# Patient Record
Sex: MALE | Race: OTHER | Employment: UNEMPLOYED | RURAL
[De-identification: names, ages, dates, MRNs, and addresses within clinical notes are randomized per-mention and may not be internally consistent; named-entity substitution may affect disease eponyms.]

---
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
## 2025-04-16 ENCOUNTER — OFFICE VISIT (OUTPATIENT)
Facility: CLINIC | Age: 20
End: 2025-04-16
Payer: MEDICAID

## 2025-04-16 VITALS
DIASTOLIC BLOOD PRESSURE: 84 MMHG | HEIGHT: 70 IN | HEART RATE: 75 BPM | WEIGHT: 272.6 LBS | RESPIRATION RATE: 12 BRPM | BODY MASS INDEX: 39.03 KG/M2 | TEMPERATURE: 98.1 F | SYSTOLIC BLOOD PRESSURE: 134 MMHG | OXYGEN SATURATION: 96 %

## 2025-04-16 DIAGNOSIS — Z02.89 ENCOUNTER FOR OCCUPATIONAL HEALTH ASSESSMENT: Primary | ICD-10-CM

## 2025-04-16 PROBLEM — F41.9 ANXIETY: Status: RESOLVED | Noted: 2019-06-17 | Resolved: 2025-04-16

## 2025-04-16 PROBLEM — F43.29 ADJUSTMENT DISORDER WITH DISTURBANCE OF EMOTION: Status: RESOLVED | Noted: 2018-10-04 | Resolved: 2025-04-16

## 2025-04-16 PROCEDURE — 99212 OFFICE O/P EST SF 10 MIN: CPT | Performed by: FAMILY MEDICINE

## 2025-04-16 SDOH — ECONOMIC STABILITY: FOOD INSECURITY: WITHIN THE PAST 12 MONTHS, YOU WORRIED THAT YOUR FOOD WOULD RUN OUT BEFORE YOU GOT MONEY TO BUY MORE.: NEVER TRUE

## 2025-04-16 SDOH — ECONOMIC STABILITY: FOOD INSECURITY: WITHIN THE PAST 12 MONTHS, THE FOOD YOU BOUGHT JUST DIDN'T LAST AND YOU DIDN'T HAVE MONEY TO GET MORE.: NEVER TRUE

## 2025-04-16 ASSESSMENT — ANXIETY QUESTIONNAIRES
2. NOT BEING ABLE TO STOP OR CONTROL WORRYING: NOT AT ALL
3. WORRYING TOO MUCH ABOUT DIFFERENT THINGS: NOT AT ALL
4. TROUBLE RELAXING: NOT AT ALL
7. FEELING AFRAID AS IF SOMETHING AWFUL MIGHT HAPPEN: NOT AT ALL
6. BECOMING EASILY ANNOYED OR IRRITABLE: NOT AT ALL
5. BEING SO RESTLESS THAT IT IS HARD TO SIT STILL: NOT AT ALL
GAD7 TOTAL SCORE: 0
IF YOU CHECKED OFF ANY PROBLEMS ON THIS QUESTIONNAIRE, HOW DIFFICULT HAVE THESE PROBLEMS MADE IT FOR YOU TO DO YOUR WORK, TAKE CARE OF THINGS AT HOME, OR GET ALONG WITH OTHER PEOPLE: NOT DIFFICULT AT ALL
1. FEELING NERVOUS, ANXIOUS, OR ON EDGE: NOT AT ALL

## 2025-04-16 ASSESSMENT — PATIENT HEALTH QUESTIONNAIRE - PHQ9
SUM OF ALL RESPONSES TO PHQ QUESTIONS 1-9: 0
SUM OF ALL RESPONSES TO PHQ QUESTIONS 1-9: 0
1. LITTLE INTEREST OR PLEASURE IN DOING THINGS: NOT AT ALL
SUM OF ALL RESPONSES TO PHQ QUESTIONS 1-9: 0
2. FEELING DOWN, DEPRESSED OR HOPELESS: NOT AT ALL
SUM OF ALL RESPONSES TO PHQ QUESTIONS 1-9: 0

## 2025-04-16 NOTE — PROGRESS NOTES
\"Have you been to the ER, urgent care clinic since your last visit?  Hospitalized since your last visit?\"    NO    “Have you seen or consulted any other health care providers outside our system since your last visit?”    NO             Chief Complaint   Patient presents with    Letter for School/Work     Would like a letter that states he does not have any symptoms in anxiety/depression. Would like to also check to see if he needs any updates on immunizations

## 2025-04-16 NOTE — PROGRESS NOTES
Ailyn Correa II (:  2005) is a 20 y.o. male, Established patient, here for evaluation of the following chief complaint(s): Noted to join the   Letter for School/Work (Would like a letter that states he does not have any symptoms in anxiety/depression. Would like to also check to see if he needs any updates on immunizations)         Assessment & Plan  1. Depression.  - Reports no longer experiencing symptoms of depression and has been off medication for 8 months.  - Manages symptoms through personal coping mechanisms such as writing, nature walks, and reading.  - No current signs of suicidal ideation or self-harm.  - A note confirming the absence of depressive symptoms will be provided to support his application to the [employer].    2. Anxiety.  - Reports no longer experiencing symptoms of anxiety and has been off medication for 8 months.  - Manages symptoms through personal coping mechanisms such as writing, nature walks, and reading.  - No current signs of anxiety or panic attacks.  - A note confirming the absence of anxiety symptoms will be provided to support his application to the [employer].    Results    1. Encounter for occupational health assessment          Diagnosis Orders   1. Encounter for occupational health assessment          No orders of the defined types were placed in this encounter.    No current outpatient medications on file.     No current facility-administered medications for this visit.     Return if symptoms worsen or fail to improve.      Subjective   History of Present Illness  The patient is a 20-year-old male who presents for evaluation of depression and anxiety.    He is seeking a written confirmation of his current mental health status, specifically the absence of depressive and anxiety symptoms, as part of his application process for the [employer]. He has been managing his mental health independently for the past 8 months, without the aid of any medications. His mood remains

## 2025-07-30 NOTE — PROGRESS NOTES
Ailyn Correa II (:  2005) is a 20 y.o. male, Established patient, here for evaluation of the following chief complaint(s): paperwork  Check-Up (Needs updatd letter for )         Assessment & Plan  1. Anxiety.  - Reports no current symptoms of anxiety.  - No medications are being taken.  - Verification paperwork requested to confirm absence of anxiety symptoms.  - A note will be provided stating that he does not suffer from anxiety, is not on any medications, and is cleared to join the [employer].    2. Depression.  - Reports no current symptoms of depression, including sadness, crying, or thoughts of self-harm.  - No medications are being taken.  - Verification paperwork requested to confirm absence of depression symptoms.  - A note will be provided stating that he does not suffer from depression, is not on any medications, and is cleared to join the [employer].    Results    1. History of anxiety          Diagnosis Orders   1. History of anxiety          No orders of the defined types were placed in this encounter.    No current outpatient medications on file.     No current facility-administered medications for this visit.     No follow-ups on file.      Subjective   History of Present Illness  The patient is a 20-year-old male who presents for evaluation of anxiety and depression.    He is seeking documentation to confirm that he no longer exhibits symptoms of anxiety or depression, which will facilitate his application process for  service. He had previously obtained this paperwork in 2025 but misplaced it due to unforeseen family circumstances that required him to secure employment. Currently, he is not on any medication. He reports feeling well, with no signs of sadness or depression. He also does not experience excessive crying, self-harm thoughts, or auditory hallucinations.    Review of Systems       Objective     /81 (BP Site: Left Upper Arm, Patient Position: Sitting, BP Cuff

## 2025-07-31 ENCOUNTER — OFFICE VISIT (OUTPATIENT)
Facility: CLINIC | Age: 20
End: 2025-07-31
Payer: MEDICAID

## 2025-07-31 VITALS
HEART RATE: 82 BPM | WEIGHT: 310.4 LBS | HEIGHT: 70 IN | SYSTOLIC BLOOD PRESSURE: 131 MMHG | DIASTOLIC BLOOD PRESSURE: 81 MMHG | TEMPERATURE: 98.2 F | BODY MASS INDEX: 44.44 KG/M2 | OXYGEN SATURATION: 98 % | RESPIRATION RATE: 18 BRPM

## 2025-07-31 DIAGNOSIS — Z86.59 HISTORY OF ANXIETY: Primary | ICD-10-CM

## 2025-07-31 PROCEDURE — 99212 OFFICE O/P EST SF 10 MIN: CPT | Performed by: FAMILY MEDICINE

## 2025-07-31 NOTE — PROGRESS NOTES
Have you been to the ER, urgent care clinic since your last visit?  Hospitalized since your last visit?   NO    Have you seen or consulted any other health care providers outside our system since your last visit?   NO             Chief Complaint   Patient presents with    Check-Up     Needs updatd letter for